# Patient Record
(demographics unavailable — no encounter records)

---

## 2024-10-17 NOTE — HISTORY OF PRESENT ILLNESS
[FreeTextEntry1] : 40 y/o female with PMHx of melanoma RUE s/p removal 2018 at [facility] [place], pituitary adenoma dx April 2021 during workup for amenorrhea and galactorrhea x 5 months, unable to tolerate cabergoline and bromocriptine due to side effects. Now s/p endoscopic endonasal resection of tumor 5/13/22 (with ENT [facility]) with post op resolution of prolactinemia. PATH: pituitary adenoma: positive staining for prolactin; negative staining for LH, FSH, ACTH, TSH, growth hormone   Last seen 8/16/23 for follow- up and review of MRI done 8/11/23 which was without evidence of residual/recurrence. Continued f/u with endocrinologist Dr. Chin, no reported lab abnormalities. Menstrual cycles normalized.  Returns TODAY for 1 year follow- up and MRI review.  10/10/24 MRI: Stable exam compared to 8/11/2023: Status post transsphenoidal surgery. No evidence of residual/recurrent pituitary adenoma.  Endocrinologist: Dr. Chin Neurologist: Rickey Meraz GYN: Lucius Wilde MD

## 2024-10-17 NOTE — DATA REVIEWED
[de-identified] :  	 ACC: [id number]     EXAM:  MR SELLA ONLY WAW IC   ORDERED BY: Chandrika Clifford  PROCEDURE DATE:  10/10/2024    INTERPRETATION:  Clinical history: Pituitary adenoma status post resection, follow-up  Technique: MRI of the pituitary with and without contrast.  Multiplanar multi-sequential MRI of the pituitary was performed before and following the intravenous administration of 6.5 cc Gadavist (3.5 cc discarded).  Comparison: Previous brain/pituitary MRIs most recently 8/11/2023  Findings:  The patient is again status post transsphenoidal resection of a previously seen right sided pituitary microadenoma. Redemonstrated subtle slitlike area of hypoenhancement in the right inferior pituitary gland, similar to prior study and likely representing contracted surgical site. There is no evidence of residual or recurrent adenoma.  The pituitary infundibulum is unremarkable. The optic chiasm is unremarkable. The cavernous sinuses are unremarkable.  Axial FLAIR sequence of the whole brain is unremarkable. Axial postcontrast sequence of the whole brain demonstrates no enhancing lesions.  Redemonstrated stable mildly low-lying cerebellar tonsils.  IMPRESSION:  Stable exam compared to 8/11/2023: Status post transsphenoidal surgery. No evidence of residual/recurrent pituitary adenoma.  --- End of Report ---

## 2024-10-17 NOTE — REASON FOR VISIT
[Follow-Up: _____] : a [unfilled] follow-up visit [FreeTextEntry1] : s/p transsphenoidal resection of pituitary adenoma 5/13/22. 1 year follow- up and MRI review

## 2024-10-25 NOTE — REASON FOR VISIT
[Home] : at home, [unfilled] , at the time of the visit. [Medical Office: ([facility])___] : at the medical office located in  [Patient] : the patient [Follow-Up: _____] : a [unfilled] follow-up visit

## 2024-10-28 NOTE — HISTORY OF PRESENT ILLNESS
[FreeTextEntry1] : 10/28/24 HPI: Maria Luisa is a 41 year old female presenting for a follow up visit for migraines.  Tried Emgality? 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   ---------------------------- Last seen 6/7/23: Doing very well since last visit. Previously getting 15/30 headache days per month on Nortriptyline 20mg. Recently discontinued Nortriptyline and started Topiramate 25mg nightly and Rizatriptan prn. Notes increased sleepiness the first week she started Topiramate but has since improved. Headache days reduced to <1 per week, resolve with Rizatriptan. Triggered by stress or weather changes.  Also notes the Topiramate has helped with sleep latency.

## 2024-10-28 NOTE — DISCUSSION/SUMMARY
[FreeTextEntry1] : Impression: 1) Migraine without aura - significantly improved with Topiramate prophylaxis and Rizatriptan prn 2) Insomnia - improving on Topiramate  Plan: 1)

## 2025-01-27 NOTE — HISTORY OF PRESENT ILLNESS
[Hearing Loss] : hearing loss [Early Onset Hearing Loss] : early onset hearing loss [de-identified] : 41 y.o. Female presents with bilateral hearing loss, worse on the left, and sinus pain and pressure.  Pt denies ear pain, tinnitus, dizziness, otorrhea. Gradual hearing loss over the ears.  Pt notices that she speaks loudly and says "what" frequently.  She uses Air Pods.  Pt had a sinus infection last week and was prescribed amoxicillin.  Pt has hx of transsphenoidal pituitary resection in 05/2022 and has had sinus infections a few times a year since then. Denies any otalgia, otorrhea, or tinnitus. Feels like ears are clogged. Uses Qtips.  [Ear Fullness] : no ear fullness [Tinnitus] : no tinnitus [Anxiety] : no anxiety [Dizziness] : no dizziness [Headache] : no headache [Neurologic Symptoms] : no associated neurologic symptoms [Orthostatic Hypotension] : no orthostatic hypotension [Otalgia] : no otalgia [Otorrhea] : no otorrhea [Vertigo] : no vertigo [Visual Changes] : no visual changes [Recurrent Otitis Media] : no recurrent otitis media [Meningitis] : no meningitis [Glomus Tumor] : no glomus tumor [Otitis Media with Effusion] : no otitis media with effusion [Stroke] : no stroke [Acoustic Neuroma] : no acoustic neuroma [Presbycusis] : no presbycusis [Prior Ear Surgery] : no prior ear surgery [Facial Nerve Paralysis] : no facial nerve paralysis [Congenital Ear Malformation] : no congenital ear malformation [Allergic Rhinitis] : no allergic rhinitis [Major Depression] : no major depression [Meniere Disease] : no Meniere disease [Eustachian Tube Dysfunction] : no eustachian tube dysfunction [Diabetes] : no diabetes [Otosclerosis] : no otosclerosis [Cholesteatoma] : no cholesteatoma [Multiple Sclerosis] : no multiple sclerosis [Perilymphatic Fistula] : no perilymphatic fistula [Autoimmune Diseases] : no autoimmune diseases [Cardiac Disease] : no cardiac disease [Hypertension] : no hypertension [Hypotension] : no hypotension [Ototoxic Med Exposure] : no ototoxic medication exposure [History of TM Perforation] : no history of a tympanic membrane perforation [Loud Noise Exposure] : no history of loud noise exposure [Hx of Radiation Therapy] : no history of radiation therapy [Birth Prematurity] : no birth prematurity [Smoking] : no smoking [Alcohol] : no consumption of alcohol [de-identified] : father.

## 2025-01-27 NOTE — PROCEDURE
[Debridement] : debridement  [Rigid Endoscope] : examined with a rigid endoscope [Congested] : congested [Nasal Mucosa] : purulence on the left [de-identified] : crusting  [FreeTextEntry6] : The following anatomic sites were directly examined in a sequential fashion: The scope was introduced in the nasal passage between the middle and inferior turbinates to exam the inferior portion of the middle meatus and the fontanelle, as well as the maxillary ostia. Next, the scope was passed medically and posteriorly to the middle turbinates to examine the sphenoethmoid recess and the superior turbinate region. purulence left

## 2025-03-03 NOTE — PROCEDURE
[Trigger point 1-2 muscle groups] : trigger point 1-2 muscle groups [Bilateral] : bilaterally of the [Thoracic paraspinal muscle] : thoracic paraspinal muscle [Pain] : pain [Alcohol] : alcohol [___ cc    30mg] : Ketorolac (Toradol) ~Vcc of 30 mg  [Call if redness, pain or fever occur] : call if redness, pain or fever occur [Apply ice for 15min out of every hour for the next 12-24 hours as tolerated] : apply ice for 15 minutes out of every hour for the next 12-24 hours as tolerated [Previous OTC use and PT nontherapeutic] : patient has tried OTC's including aspirin, Ibuprofen, Aleve, etc or prescription NSAIDS, and/or exercises at home and/or physical therapy without satisfactory response [Risks, benefits, alternatives discussed / Verbal consent obtained] : the risks benefits, and alternatives have been discussed, and verbal consent was obtained

## 2025-03-03 NOTE — HISTORY OF PRESENT ILLNESS
[FreeTextEntry1] : HISTORY OF PRESENT ILLNESS: Mrs. Mathews is a 41-year-old female complaining of mid back and neck pain. The pain started after no specific injury or event. The patient has had this pain for years.  Patient describes the pain as moderate to severe.  During the last month the pain has been nearly constant with symptoms worsening in no typical pattern. She describes her neck pain as sharp, burning, tingling that radiates down the right arm and is 8/10 pain intensity. Cervical flexion worsens the pain. NSAIDs has been tried without relief. Patient denies bowel/bladder incontinence, weakness, falls, tingling, numbness.  As for her mid back pain that is sharp, burning, tingling that does radiates across into her ribs/abdomen that is 7-8/10 pain intensity. Sitting and bending worsens the pain, standing improves the pain. NSAIDs has been tried without relief.  She has been attending sessions of chiropractic therapy with minimal to no relief. Patient denies bowel/bladder incontinence, weakness, falls.   ACTIVITIES: Patient uses no assistive walking device at this time.  Patient has difficulty performing household chores, going to work, doing yardwork or shopping, participating in recreational activities & exercise at this time.   Prior Pain Medications: Advil, ibuprofen, Tylenol.

## 2025-03-03 NOTE — DISCUSSION/SUMMARY
[de-identified] : A discussion regarding available pain management treatment options occurred with the patient. These included interventional, rehabilitative, pharmacological, and alternative modalities. We will proceed with the following:   Interventional treatment options: -Opted to perform thoracic paraspinal trigger point injection in office today. Risks with the procedure such as bleeding and infection was discussed in detail.  Rehabilitative options: -Participation in active HEP was discussed and printed. I gave the patient a list of home exercises to do for pain reduction and overall improvement in functional status including but not limited to active neck rotation, active neck side bend, neck flexion, neck extension, chin tuck, scalene stretch, isometric neck flexion, isometric neck extension, isometric neck side bend, head lift/neck curl, head lift/neck side bend, neck extension on hands and knees, and scapula squeeze.   Medication based treatment options:  - ordered a Medrol Dose Pack 4mg, use as directed for a duration of 6 days.  - ordered Diclofenac 75mg twice daily for duration of 30 days.  Complementary treatment options: - Patient was advised to stay away from any heavy lifting. If needed, she was advised to squat and not bend forward. - Initiate physician directed activity and lifestyle modifications.  Follow up in 4-6 weeks for reassessment.  I, Nicole Ash, attest that this documentation has been prepared under the direction and in the presence of Provider Alfred Graham, DO The documentation recorded by the scribe, in my presence, accurately reflects the service I personally performed, and the decisions made by me with my edits as appropriate.   Best Regards, Alfred Graham D.O.

## 2025-03-03 NOTE — PHYSICAL EXAM
[de-identified] : Thoracic spine exam: TTP to the right T6-8 thoracic paraspinal.  Pain with flexion and extension.  Cervical Spine Exam: Inspection: erythema (-)   ecchymosis (-)                                      thoracic  paraspinal mm tenderness: R (+); L(-)   Upper trapezius mm tenderness:  R (+); L (-)     ROM: Pain with lateral rotation to the left. Special Testing: Spurling Test: R (-); L (-)   Facet load test: R (-); L (-)

## 2025-03-27 NOTE — PHYSICAL EXAM
[de-identified] : Thoracic spine exam: TTP to the right T6-10 thoracic paraspinal.  Pain with flexion and extension.  Cervical Spine Exam: Inspection: erythema (-)   ecchymosis (-)                                      thoracic  paraspinal mm tenderness: R (+); L(-)   Upper trapezius mm tenderness:  R (+); L (-)     ROM: Pain with lateral rotation to the left. Special Testing: Spurling Test: R (-); L (-)   Facet load test: R (-); L (-)

## 2025-03-27 NOTE — DISCUSSION/SUMMARY
[de-identified] : A discussion regarding available pain management treatment options occurred with the patient. These included interventional, rehabilitative, pharmacological, and alternative modalities. We will proceed with the following:   Interventional treatment options: - Proceed with T9-10 LESI with NO MAC - Treatment options were discussed with the patient. The patient has been having persistent thoracic radicular pain with minimal improvement with conservative therapies. Given that the patient has severe pain and failed conservative treatment, the patient was given the option to proceed with a lumbar epidural steroid injection to try to get some pain relief. - The risks and benefits were discussed which included bleeding, infection, nerve injury, no pain relief or worse, increased pain. All questions were answered and concerns addressed.  Rehabilitative options: -Participation in active HEP was discussed and printed. I gave the patient a list of home exercises to do for pain reduction and overall improvement in functional status including but not limited to active neck rotation, active neck side bend, neck flexion, neck extension, chin tuck, scalene stretch, isometric neck flexion, isometric neck extension, isometric neck side bend, head lift/neck curl, head lift/neck side bend, neck extension on hands and knees, and scapula squeeze.   Medication based treatment options:  - C/W Diclofenac 75mg twice daily as needed  - OTC Tylenol PRN   Complementary treatment options: - Patient was advised to stay away from any heavy lifting. If needed, she was advised to squat and not bend forward. - Initiate physician directed activity and lifestyle modifications.  Follow up in 2 weeks post injection   BRADY Mello DO

## 2025-03-27 NOTE — DISCUSSION/SUMMARY
[de-identified] : A discussion regarding available pain management treatment options occurred with the patient. These included interventional, rehabilitative, pharmacological, and alternative modalities. We will proceed with the following:   Interventional treatment options: - Proceed with T9-10 LESI with NO MAC - Treatment options were discussed with the patient. The patient has been having persistent thoracic radicular pain with minimal improvement with conservative therapies. Given that the patient has severe pain and failed conservative treatment, the patient was given the option to proceed with a lumbar epidural steroid injection to try to get some pain relief. - The risks and benefits were discussed which included bleeding, infection, nerve injury, no pain relief or worse, increased pain. All questions were answered and concerns addressed.  Rehabilitative options: -Participation in active HEP was discussed and printed. I gave the patient a list of home exercises to do for pain reduction and overall improvement in functional status including but not limited to active neck rotation, active neck side bend, neck flexion, neck extension, chin tuck, scalene stretch, isometric neck flexion, isometric neck extension, isometric neck side bend, head lift/neck curl, head lift/neck side bend, neck extension on hands and knees, and scapula squeeze.   Medication based treatment options:  - C/W Diclofenac 75mg twice daily as needed  - OTC Tylenol PRN   Complementary treatment options: - Patient was advised to stay away from any heavy lifting. If needed, she was advised to squat and not bend forward. - Initiate physician directed activity and lifestyle modifications.  Follow up in 2 weeks post injection   BRADY Mello DO

## 2025-03-27 NOTE — PHYSICAL EXAM
[de-identified] : Thoracic spine exam: TTP to the right T6-10 thoracic paraspinal.  Pain with flexion and extension.  Cervical Spine Exam: Inspection: erythema (-)   ecchymosis (-)                                      thoracic  paraspinal mm tenderness: R (+); L(-)   Upper trapezius mm tenderness:  R (+); L (-)     ROM: Pain with lateral rotation to the left. Special Testing: Spurling Test: R (-); L (-)   Facet load test: R (-); L (-)

## 2025-03-27 NOTE — HISTORY OF PRESENT ILLNESS
[FreeTextEntry1] : HISTORY OF PRESENT ILLNESS: Mrs. Mathews is a 41-year-old female complaining of mid back and neck pain. The pain started after no specific injury or event. The patient has had this pain for years.  Patient describes the pain as moderate to severe.  During the last month the pain has been nearly constant with symptoms worsening in no typical pattern. She describes her neck pain as sharp, burning, tingling that radiates down the right arm and is 8/10 pain intensity. Cervical flexion worsens the pain. NSAIDs has been tried without relief. Patient denies bowel/bladder incontinence, weakness, falls, tingling, numbness.  As for her mid back pain that is sharp, burning, tingling that does radiates across into her ribs/abdomen that is 7-8/10 pain intensity. Sitting and bending worsens the pain, standing improves the pain. NSAIDs has been tried without relief.  She has been attending sessions of chiropractic therapy with minimal to no relief. Patient denies bowel/bladder incontinence, weakness, falls.   ACTIVITIES: Patient uses no assistive walking device at this time.  Patient has difficulty performing household chores, going to work, doing yardwork or shopping, participating in recreational activities & exercise at this time.   Prior Pain Medications: Advil, ibuprofen, Tylenol.  PRESENTING TODAY 03-: Patient presents to the office today for a follow up visit with continued thoracic back pain, she underwent TPI on her last visit which she states gave her relief for one week and her pain came back to baseline, she started taking the diclofenac and steroid pack, however she had to D/C them since it hurt her stomach. She states that her pain starts in the mid back around the T6-7 region and radiates down to the T19-10 region. Pain is 8/10.

## 2025-04-03 NOTE — REASON FOR VISIT
[Home] : at home, [unfilled] , at the time of the visit. [Medical Office: ([facility])___] : at the medical office located in  [Telehealth (audio & video)] : This visit was provided via telehealth using real-time 2-way audio visual technology. [Verbal consent obtained from patient] : the patient, [unfilled] [Follow-Up: _____] : a [unfilled] follow-up visit

## 2025-04-03 NOTE — PROCEDURE
[FreeTextEntry3] : Date of Service: 04/02/2025   Account: [id number]  Patient: PILO DUMONT   YOB: 1983  Age: 41 year  Surgeon:  Alfred Graham DO  Assistant: None  Pre-Operative Diagnosis:         Thoracic Radiculopathy  Post Operative Diagnosis:       Thoracic Radiculopathy   Procedure: Thoracic interlaminar epidural steroid injection under fluoroscopic guidance   Anesthesia: none  This procedure was carried out using fluoroscopic guidance.  The risks and benefits of the procedure were discussed extensively with the patient.  The consent of the patient was obtained and the following procedure was performed. The patient was placed in the prone position on the fluoroscopy table and the area was prepped and draped in a sterile fashion.  A timeout was performed with all essential staff present and the site and side were verified.   The fluoroscope visualized the T9-T10 interspace using slight cephalad-caudad angulation and this area was marked.  Using sterile technique the superficial skin was anesthetized with 1% Lidocaine.  A 20 gauge 3.5 inch Tuohy needle was advanced under fluoroscopy until ligament was engaged.  Using a lateral view, a "loss of resistance" to air technique was utilized in order to gain access to the epidural space.  After negative aspiration for heme and CSF, 1 cc of Omnipaque contrast was administered and the appropriate thoracic epidurogram was obtained in the MERI and A/P view as well as digital subtraction angiography.  A total injectate of 4 cc of preservative free normal saline and 20mg of dexamethasone was then injected into the epidural space while maintaining meaningful verbal contact with the patient.   The needle was subsequently removed.  Vital signs remained normal.  Pulse oximeter was used throughout the procedure and the patient's pulse and oxygen saturation remained within normal limits.  The patient tolerated the procedure well.  There were no complications.  The patient was instructed to apply ice over the injection sites for twenty minutes every two hours for the next 24 to 48 hours.  Of note, patient was screaming during the entire procedure even with minor movements that does not typically cause pain. It was witnessed by the xray tech and medical assistant. I offered to stop the procedure and she refused to stop the procedure. Pain was out of proportion. Patient was happy and smiling in the post operative area and witnessed by Chandrika Ferreira, nursing. I stated "You did very well" and she thanked me.  4/3/2025-She called Joelle, the , stating it was a very traumatic experience and will not be coming back in addition to other things which was on a recorded line. I called the patient after I was notified of this. She stated that she should have been "knocked out sleeping" for the procedure. I stated we do not offer that and we do this 60 times a week and patients do not have a problem with oral sedation and have minimal to no pain. Again, this is all on a recorded line. When we sprayed her skin with numbing spray, she was screaming and screamed through the procedure which did not make sense. Ritu, the Xray tech and Tess RG) can attest to this.   Disposition: 1) The patient was advised to F/U in 1-2 weeks to assess the response to the injection. 2) The patient was also instructed to contact me immediately if there were any concerns related to the procedure performed.

## 2025-04-03 NOTE — PROCEDURE
[FreeTextEntry3] : Date of Service: 04/02/2025   Account: [id number]  Patient: PILO DUMONT   YOB: 1983  Age: 41 year  Surgeon:  Alfred Graham DO  Assistant: None  Pre-Operative Diagnosis:         Thoracic Radiculopathy  Post Operative Diagnosis:       Thoracic Radiculopathy   Procedure: Thoracic interlaminar epidural steroid injection under fluoroscopic guidance   Anesthesia: none  This procedure was carried out using fluoroscopic guidance.  The risks and benefits of the procedure were discussed extensively with the patient.  The consent of the patient was obtained and the following procedure was performed. The patient was placed in the prone position on the fluoroscopy table and the area was prepped and draped in a sterile fashion.  A timeout was performed with all essential staff present and the site and side were verified.   The fluoroscope visualized the T9-T10 interspace using slight cephalad-caudad angulation and this area was marked.  Using sterile technique the superficial skin was anesthetized with 1% Lidocaine.  A 20 gauge 3.5 inch Tuohy needle was advanced under fluoroscopy until ligament was engaged.  Using a lateral view, a "loss of resistance" to air technique was utilized in order to gain access to the epidural space.  After negative aspiration for heme and CSF, 1 cc of Omnipaque contrast was administered and the appropriate thoracic epidurogram was obtained in the MERI and A/P view as well as digital subtraction angiography.  A total injectate of 4 cc of preservative free normal saline and 20mg of dexamethasone was then injected into the epidural space while maintaining meaningful verbal contact with the patient.   The needle was subsequently removed.  Vital signs remained normal.  Pulse oximeter was used throughout the procedure and the patient's pulse and oxygen saturation remained within normal limits.  The patient tolerated the procedure well.  There were no complications.  The patient was instructed to apply ice over the injection sites for twenty minutes every two hours for the next 24 to 48 hours.  Of note, patient was screaming during the entire procedure even with minor movements that does not typically cause pain. It was witnessed by the xray tech and medical assistant. I offered to stop the procedure and she refused to stop the procedure. Pain was out of proportion. Patient was happy and smiling in the post operative area and witnessed by Chandrika Ferreira, nursing. I stated "You did very well" and she thanked me.  4/3/2025-She called Joelle, the , stating it was a very traumatic experience and will not be coming back in addition to other things which was on a recorded line. I called the patient after I was notified of this. She stated that she should have been "knocked out sleeping" for the procedure. I stated we do not offer that and we do this 60 times a week and patients do not have a problem with oral sedation and have minimal to no pain. Again, this is all on a recorded line. When we sprayed her skin with numbing spray, she was screaming and screamed through the procedure which did not make sense. Ritu, the Xray tech and Tess RG) can attest to this.   Disposition: 1) The patient was advised to F/U in 1-2 weeks to assess the response to the injection. 2) The patient was also instructed to contact me immediately if there were any concerns related to the procedure performed.

## 2025-04-03 NOTE — REASON FOR VISIT
[Home] : at home, [unfilled] , at the time of the visit. [Medical Office: ([facility])___] : at the medical office located in  [Telehealth (audio & video)] : This visit was provided via telehealth using real-time 2-way audio visual technology. [Verbal consent obtained from patient] : the patient, [unfilled] [Follow-Up: _____] : a [unfilled] follow-up visit

## 2025-04-07 NOTE — PHYSICAL EXAM
[FreeTextEntry1] : General: Constitutional: Sitting comfortably in NAD. Psychiatric: well-groomed, appropriate affect  Cognitive: Orientation, language, memory and knowledge screens intact.  Cranial Nerves: II: MOO. III/IV/VI: EOM Full. VII: Face appears symmetric. VIII: Normal to screening. IX/X: Normal phonation. XI: Trapezius Symmetric. XII: Tongue midline.

## 2025-04-07 NOTE — DISCUSSION/SUMMARY
[FreeTextEntry1] : Impression: 1) Migraine without aura - experiencing one every 2-3 months, well controlled with Rizatriptan  2) Pituitary adenoma - stable on serial imaging  Plan: 1) Continue Rizatriptan prn 2) Repeat MRI Fall, 2025 per neurosurgery

## 2025-04-07 NOTE — HISTORY OF PRESENT ILLNESS
[FreeTextEntry1] : 4/7/25 HPI: Maria Luisa is a 41 year old female presenting for a follow up visit for migraines.  Migraines have decreased in frequency, able to discontinue Topiramate.  Rizatriptan "works like a charm", completely takes headache pain away quickly. Makes her a little sleepy, but otherwise no side effects. Using once every 2-3 months. Finds migraines are always around her L orbit.   History of scoliosis, recent epidural steroid injection in her thoracic spine.   MRI October, 2024 stable s/p pituitary adenoma resection in 2022. Due for repeat MRI later this Fall.  -------------------------- Last seen 6/7/23: Doing very well since last visit. Previously getting 15/30 headache days per month on Nortriptyline 20mg. Recently discontinued Nortriptyline and started Topiramate 25mg nightly and Rizatriptan prn. Notes increased sleepiness the first week she started Topiramate but has since improved. Headache days reduced to <1 per week, resolve with Rizatriptan. Triggered by stress or weather changes.  Also notes the Topiramate has helped with sleep latency.

## 2025-06-04 NOTE — HISTORY OF PRESENT ILLNESS
[FreeTextEntry1] : 42 y/o female with PMHx of melanoma RUE s/p removal 2018 at [facility] [place], pituitary adenoma dx April 2021 during workup for amenorrhea and galactorrhea x 5 months, unable to tolerate cabergoline and bromocriptine due to side effects. Now s/p endoscopic endonasal resection of tumor 5/13/22 (with ENT [facility]) with post op resolution of prolactinemia. PATH: pituitary adenoma; Immunohistochemical studies performed on block 2C demonstrate positive staining of the tumor cells for prolactin and negative staining for LH, FSH, ACTH, TSH and growth hormone.   Postoperatively patient overall recovered well with improvement in symptoms. She has continued follow- up with serial imaging.  Menstrual cycles normalized.  Has continued follow- up with endocrinology, no reported lab abnormalities.   10/10/24 MRI stable without residual/recurrence.  Recommended one year follow- up scan.   TODAY pt returns for follow- up and MRI review,  MRI ordered earlier than planned due to her notifying drainage coming from her nose, feelings of metallic taste. MRI/ CT max/face ordered.  5/17/25 MRI- Stable postsurgical changes reflecting transsphenoidal resection of pituitary mass since the prior MR brain from 10/10/2024. No evidence of recurrent mass. Symptoms come and go and are not persistent.   Endocrinologist: Dr. Chin Neurology: Rickey Meraz; Nataliya Helms NP GYN: Lucius Wilde MD

## 2025-06-04 NOTE — DATA REVIEWED
[de-identified] : ACC: [id number]     EXAM:  MR SELLA ONLY [facility] IC   ORDERED BY: TONY BRODERICK  PROCEDURE DATE:  05/17/2025    INTERPRETATION:  Clinical indication: History of transsphenoidal pituitary resection.  Technique: Multiplanar multisequential MR imaging of the pituitary was performed before and after the intravenous administration of contrast, including dynamic imaging. Contrast: 7 mL of Gadavist was administered.  Comparison: MR sella dated 2/3/2023 and 10/10/2024.  Findings:  The patient is again noted to be status post transsphenoidal resection of pituitary mass. There is stable 3 mm slitlike nonenhancement in the right inferior aspect of the pituitary gland likely reflecting contracted resection bed. Otherwise there is no evidence of recurrent or residual mass. The sella is normal in size. The infundibulum is midline. The suprasellar region, optic chiasm and cavernous sinuses are normal.  No acute infarction or intracranial hemorrhage.  The ventricles are normal without evidence of hydrocephalus. There are no extra-axial fluid collections.  The visualized intraorbital contents are normal. Status post bilateral sphenoethmoidectomy for transsphenoidal surgery. The imaged portions of the paranasal sinuses are clear. The mastoid air cells are clear. The visualized soft tissues appear normal.  Impression:  Stable postsurgical changes reflecting transsphenoidal resection of pituitary mass since the prior MR brain from 10/10/2024. No evidence of recurrent mass.  --- End of Report ---      CODY FISHER MD; Attending Radiologist This document has been electronically signed. May 20 2025  4:19PM

## 2025-06-04 NOTE — REASON FOR VISIT
[Follow-Up: _____] : a [unfilled] follow-up visit [Home] : at home, [unfilled] , at the time of the visit. [Medical Office: ([facility])___] : at the medical office located in  [Telehealth (audio & video)] : This visit was provided via telehealth using real-time 2-way audio visual technology. [Verbal consent obtained from patient] : the patient, [unfilled] [FreeTextEntry1] : hx of  endoscopic endonasal resection of pituitary tumor 5/13/22. Follow- up and MRI review

## 2025-06-04 NOTE — REASON FOR VISIT
[Follow-Up: _____] : a [unfilled] follow-up visit [Home] : at home, [unfilled] , at the time of the visit. [Medical Office: ([facility])___] : at the medical office located in  [Telehealth (audio & video)] : This visit was provided via telehealth using real-time 2-way audio visual technology. [Verbal consent obtained from patient] : the patient, [unfilled] [FreeTextEntry1] : hx of  endoscopic endonasal resection of pituitary tumor 5/13/22. Follow- up and MRI review

## 2025-06-04 NOTE — DATA REVIEWED
[de-identified] : ACC: [id number]     EXAM:  MR SELLA ONLY [facility] IC   ORDERED BY: TONY BRODERICK  PROCEDURE DATE:  05/17/2025    INTERPRETATION:  Clinical indication: History of transsphenoidal pituitary resection.  Technique: Multiplanar multisequential MR imaging of the pituitary was performed before and after the intravenous administration of contrast, including dynamic imaging. Contrast: 7 mL of Gadavist was administered.  Comparison: MR sella dated 2/3/2023 and 10/10/2024.  Findings:  The patient is again noted to be status post transsphenoidal resection of pituitary mass. There is stable 3 mm slitlike nonenhancement in the right inferior aspect of the pituitary gland likely reflecting contracted resection bed. Otherwise there is no evidence of recurrent or residual mass. The sella is normal in size. The infundibulum is midline. The suprasellar region, optic chiasm and cavernous sinuses are normal.  No acute infarction or intracranial hemorrhage.  The ventricles are normal without evidence of hydrocephalus. There are no extra-axial fluid collections.  The visualized intraorbital contents are normal. Status post bilateral sphenoethmoidectomy for transsphenoidal surgery. The imaged portions of the paranasal sinuses are clear. The mastoid air cells are clear. The visualized soft tissues appear normal.  Impression:  Stable postsurgical changes reflecting transsphenoidal resection of pituitary mass since the prior MR brain from 10/10/2024. No evidence of recurrent mass.  --- End of Report ---      CODY FISHER MD; Attending Radiologist This document has been electronically signed. May 20 2025  4:19PM

## 2025-06-04 NOTE — HISTORY OF PRESENT ILLNESS
[FreeTextEntry1] : 40 y/o female with PMHx of melanoma RUE s/p removal 2018 at [facility] [place], pituitary adenoma dx April 2021 during workup for amenorrhea and galactorrhea x 5 months, unable to tolerate cabergoline and bromocriptine due to side effects. Now s/p endoscopic endonasal resection of tumor 5/13/22 (with ENT [facility]) with post op resolution of prolactinemia. PATH: pituitary adenoma; Immunohistochemical studies performed on block 2C demonstrate positive staining of the tumor cells for prolactin and negative staining for LH, FSH, ACTH, TSH and growth hormone.   Postoperatively patient overall recovered well with improvement in symptoms. She has continued follow- up with serial imaging.  Menstrual cycles normalized.  Has continued follow- up with endocrinology, no reported lab abnormalities.   10/10/24 MRI stable without residual/recurrence.  Recommended one year follow- up scan.   TODAY pt returns for follow- up and MRI review,  MRI ordered earlier than planned due to her notifying drainage coming from her nose, feelings of metallic taste. MRI/ CT max/face ordered.  5/17/25 MRI- Stable postsurgical changes reflecting transsphenoidal resection of pituitary mass since the prior MR brain from 10/10/2024. No evidence of recurrent mass. Symptoms come and go and are not persistent.   Endocrinologist: Dr. Chin Neurology: Rickey Meraz; Nataliya Helms NP GYN: Lucius Wilde MD

## 2025-06-04 NOTE — ASSESSMENT
[FreeTextEntry1] : This 41-year-old female presented for follow-up after endoscopic endonasal resection of a pituitary adenoma on May 13, 2022.  She initially presented with amenorrhea and galactorrhea for 5 months and elevated prolactin.  She was unable to tolerate cabergoline or bromocriptine. Postoperative MRIs have shown no residual or recurrent tumor.  She recently had concerns about a possible CSF leak due to clear nasal drainage, which has resolved.  Her most recent MRI remains stable. The plan is to continue annual MRI surveillance, provide patient education regarding symptoms to monitor, and schedule follow-up in 2 year.   Dr. D'Amico independently reviewed all available images with patient.   PLAN: - No need for CT max/face unless clear drainage returns and persists - Repeat MRI 2 years with follow- up after for review. Plan made for her to call in 2 years time for ordering and scheduling.    Patient verbalizes understanding of today's discussion and next steps in treatment plan.   Today, my ACP, Chandrika Clifford, was here to observe my evaluation and management services for this new problem/exacerbated condition to be followed going forward.        Patient appreciates and agrees with current plan. This note was generated using medical dictation software. A reasonable effort has been made for proofreading its contents, but typos may still remain. If there are any questions or points of clarification needed, please notify my office.   A total of 25 minutes was spent reviewing the labs, imaging and physical examination of the patient. We discussed the diagnosis, and the plan. The patient's questions were answered. The patient demonstrated an excellent understanding of the plan.